# Patient Record
Sex: FEMALE | Race: WHITE
[De-identification: names, ages, dates, MRNs, and addresses within clinical notes are randomized per-mention and may not be internally consistent; named-entity substitution may affect disease eponyms.]

---

## 2017-10-09 ENCOUNTER — HOSPITAL ENCOUNTER (EMERGENCY)
Dept: HOSPITAL 4 - SED | Age: 27
Discharge: HOME | End: 2017-10-09
Payer: COMMERCIAL

## 2017-10-09 VITALS — SYSTOLIC BLOOD PRESSURE: 115 MMHG

## 2017-10-09 VITALS — BODY MASS INDEX: 21.49 KG/M2 | HEIGHT: 65 IN | WEIGHT: 129 LBS

## 2017-10-09 VITALS — SYSTOLIC BLOOD PRESSURE: 114 MMHG

## 2017-10-09 DIAGNOSIS — Z88.0: ICD-10-CM

## 2017-10-09 DIAGNOSIS — R03.0: ICD-10-CM

## 2017-10-09 DIAGNOSIS — N39.0: ICD-10-CM

## 2017-10-09 DIAGNOSIS — G43.909: Primary | ICD-10-CM

## 2017-10-09 LAB
ALBUMIN SERPL BCP-MCNC: 4 G/DL (ref 3.4–4.8)
ALT SERPL W P-5'-P-CCNC: 13 U/L (ref 12–78)
ANION GAP SERPL CALCULATED.3IONS-SCNC: 7 MMOL/L (ref 5–15)
APPEARANCE UR: CLEAR
AST SERPL W P-5'-P-CCNC: 6 U/L (ref 10–37)
BACTERIA URNS QL MICRO: (no result) /HPF
BASOPHILS # BLD AUTO: 0.1 K/UL (ref 0–0.2)
BASOPHILS NFR BLD AUTO: 0.8 % (ref 0–2)
BILIRUB SERPL-MCNC: 0.2 MG/DL (ref 0–1)
BILIRUB UR QL STRIP: NEGATIVE
BUN SERPL-MCNC: 5 MG/DL (ref 8–21)
CALCIUM SERPL-MCNC: 9 MG/DL (ref 8.4–11)
CHLORIDE SERPL-SCNC: 106 MMOL/L (ref 98–107)
COLOR UR: YELLOW
CREAT SERPL-MCNC: 0.62 MG/DL (ref 0.55–1.3)
EOSINOPHIL # BLD AUTO: 0.1 K/UL (ref 0–0.4)
EOSINOPHIL NFR BLD AUTO: 1.4 % (ref 0–4)
ERYTHROCYTE [DISTWIDTH] IN BLOOD BY AUTOMATED COUNT: 12.1 % (ref 9–15)
GFR SERPL CREATININE-BSD FRML MDRD: 151 ML/MIN (ref 90–?)
GLUCOSE SERPL-MCNC: 102 MG/DL (ref 70–99)
GLUCOSE UR STRIP-MCNC: NEGATIVE MG/DL
HCT VFR BLD AUTO: 38.8 % (ref 36–48)
HGB BLD-MCNC: 12.8 G/DL (ref 12–16)
HGB UR QL STRIP: (no result)
KETONES UR STRIP-MCNC: NEGATIVE MG/DL
LEUKOCYTE ESTERASE UR QL STRIP: (no result)
LYMPHOCYTES # BLD AUTO: 3 K/UL (ref 1–5.5)
LYMPHOCYTES NFR BLD AUTO: 34.3 % (ref 20.5–51.5)
MCH RBC QN AUTO: 31 PG (ref 27–31)
MCHC RBC AUTO-ENTMCNC: 33 % (ref 32–36)
MCV RBC AUTO: 93 FL (ref 79–98)
MONOCYTES # BLD MANUAL: 0.6 K/UL (ref 0–1)
MONOCYTES # BLD MANUAL: 6.4 % (ref 1.7–9.3)
MUCOUS THREADS URNS QL MICRO: (no result) /LPF
NEUTROPHILS # BLD AUTO: 4.8 K/UL (ref 1.8–7.7)
NEUTROPHILS NFR BLD AUTO: 57.1 % (ref 40–70)
NITRITE UR QL STRIP: NEGATIVE
PH UR STRIP: 6 [PH] (ref 5–8)
PLATELET # BLD AUTO: 240 K/UL (ref 130–430)
POTASSIUM SERPL-SCNC: 3.9 MMOL/L (ref 3.5–5.1)
PROT UR QL STRIP: NEGATIVE
RBC # BLD AUTO: 4.16 MIL/UL (ref 4.2–6.2)
RBC #/AREA URNS HPF: (no result) /HPF (ref 0–3)
SODIUM SERPLBLD-SCNC: 141 MMOL/L (ref 136–145)
SP GR UR STRIP: 1.01 (ref 1–1.03)
UROBILINOGEN UR STRIP-MCNC: 0.2 MG/DL (ref 0.2–1)
VALPROATE SERPL-MCNC: 65 UG/ML (ref 50–100)
WBC # BLD AUTO: 8.6 K/UL (ref 4.8–10.8)
WBC #/AREA URNS HPF: (no result) /HPF (ref 0–3)

## 2017-10-09 NOTE — NUR
Received patient alert, awake, oriented, denies headache or dizziness at this 
time. cc of intermittent HA, weakness or dizziness, denies seizure, hx of 
anxiety and epilepsy, last episode 7 yrs ago per pt. labs, ekg ,cxr and ct of 
the head done waiting for result.encourage to call when assistance needed.

## 2017-10-09 NOTE — NUR
Patient given written and verbal discharge instructions and verbalizes 
understanding.  ER MD discussed with patient the results and treatment 
provided. Patient in stable condition. ID arm band removed.

Rx of motrin, macrobid given. Patient educated on pain management and to follow 
up with PMD..

Opportunity for questions provided and answered.

## 2018-01-10 ENCOUNTER — HOSPITAL ENCOUNTER (EMERGENCY)
Dept: HOSPITAL 4 - SED | Age: 28
Discharge: HOME | End: 2018-01-10
Payer: COMMERCIAL

## 2018-01-10 VITALS — BODY MASS INDEX: 20.83 KG/M2 | WEIGHT: 125 LBS | HEIGHT: 65 IN

## 2018-01-10 VITALS — SYSTOLIC BLOOD PRESSURE: 115 MMHG

## 2018-01-10 VITALS — SYSTOLIC BLOOD PRESSURE: 105 MMHG

## 2018-01-10 DIAGNOSIS — F17.210: ICD-10-CM

## 2018-01-10 DIAGNOSIS — Z88.0: ICD-10-CM

## 2018-01-10 DIAGNOSIS — R55: Primary | ICD-10-CM

## 2018-01-10 LAB
ALBUMIN SERPL BCP-MCNC: 3.7 G/DL (ref 3.4–4.8)
ALT SERPL W P-5'-P-CCNC: 13 U/L (ref 12–78)
ANION GAP SERPL CALCULATED.3IONS-SCNC: 9 MMOL/L (ref 5–15)
APPEARANCE UR: CLEAR
AST SERPL W P-5'-P-CCNC: 9 U/L (ref 10–37)
BACTERIA URNS QL MICRO: (no result) /HPF
BASOPHILS # BLD AUTO: 0 K/UL (ref 0–0.2)
BASOPHILS NFR BLD AUTO: 0.6 % (ref 0–2)
BILIRUB SERPL-MCNC: 0.1 MG/DL (ref 0–1)
BILIRUB UR QL STRIP: NEGATIVE
BUN SERPL-MCNC: 5 MG/DL (ref 8–21)
CALCIUM SERPL-MCNC: 9.3 MG/DL (ref 8.4–11)
CHLORIDE SERPL-SCNC: 105 MMOL/L (ref 98–107)
COLOR UR: YELLOW
CREAT SERPL-MCNC: 0.49 MG/DL (ref 0.55–1.3)
EOSINOPHIL # BLD AUTO: 0 K/UL (ref 0–0.4)
EOSINOPHIL NFR BLD AUTO: 0.6 % (ref 0–4)
ERYTHROCYTE [DISTWIDTH] IN BLOOD BY AUTOMATED COUNT: 12.4 % (ref 9–15)
GFR SERPL CREATININE-BSD FRML MDRD: 198 ML/MIN (ref 90–?)
GLUCOSE SERPL-MCNC: 101 MG/DL (ref 70–99)
GLUCOSE UR STRIP-MCNC: NEGATIVE MG/DL
HCT VFR BLD AUTO: 38.5 % (ref 36–48)
HGB BLD-MCNC: 12.9 G/DL (ref 12–16)
HGB UR QL STRIP: (no result)
KETONES UR STRIP-MCNC: NEGATIVE MG/DL
LEUKOCYTE ESTERASE UR QL STRIP: NEGATIVE
LYMPHOCYTES # BLD AUTO: 2.8 K/UL (ref 1–5.5)
LYMPHOCYTES NFR BLD AUTO: 35 % (ref 20.5–51.5)
MCH RBC QN AUTO: 31 PG (ref 27–31)
MCHC RBC AUTO-ENTMCNC: 33 % (ref 32–36)
MCV RBC AUTO: 92 FL (ref 79–98)
MONOCYTES # BLD MANUAL: 0.5 K/UL (ref 0–1)
MONOCYTES # BLD MANUAL: 6.7 % (ref 1.7–9.3)
NEUTROPHILS # BLD AUTO: 4.6 K/UL (ref 1.8–7.7)
NEUTROPHILS NFR BLD AUTO: 57.1 % (ref 40–70)
NITRITE UR QL STRIP: NEGATIVE
PH UR STRIP: 7.5 [PH] (ref 5–8)
PLATELET # BLD AUTO: 372 K/UL (ref 130–430)
POTASSIUM SERPL-SCNC: 3.6 MMOL/L (ref 3.5–5.1)
PROT UR QL STRIP: NEGATIVE
RBC # BLD AUTO: 4.21 MIL/UL (ref 4.2–6.2)
RBC #/AREA URNS HPF: (no result) /HPF (ref 0–3)
SODIUM SERPLBLD-SCNC: 139 MMOL/L (ref 136–145)
SP GR UR STRIP: <1.005 (ref 1–1.03)
UROBILINOGEN UR STRIP-MCNC: 0.2 MG/DL (ref 0.2–1)
WBC # BLD AUTO: 7.9 K/UL (ref 4.8–10.8)
WBC #/AREA URNS HPF: (no result) /HPF (ref 0–3)